# Patient Record
Sex: MALE
[De-identification: names, ages, dates, MRNs, and addresses within clinical notes are randomized per-mention and may not be internally consistent; named-entity substitution may affect disease eponyms.]

---

## 2019-03-27 ENCOUNTER — HOSPITAL ENCOUNTER (OUTPATIENT)
Dept: HOSPITAL 75 - PREOP | Age: 39
Discharge: HOME | End: 2019-03-27
Attending: SURGERY
Payer: COMMERCIAL

## 2019-03-27 VITALS — BODY MASS INDEX: 27.92 KG/M2 | WEIGHT: 195 LBS | HEIGHT: 70 IN

## 2019-03-27 DIAGNOSIS — Z01.818: Primary | ICD-10-CM

## 2019-04-02 ENCOUNTER — HOSPITAL ENCOUNTER (OUTPATIENT)
Dept: HOSPITAL 75 - ENDO | Age: 39
Discharge: HOME | End: 2019-04-02
Attending: SURGERY
Payer: COMMERCIAL

## 2019-04-02 VITALS — DIASTOLIC BLOOD PRESSURE: 91 MMHG | SYSTOLIC BLOOD PRESSURE: 133 MMHG

## 2019-04-02 VITALS — SYSTOLIC BLOOD PRESSURE: 139 MMHG | DIASTOLIC BLOOD PRESSURE: 91 MMHG

## 2019-04-02 VITALS — DIASTOLIC BLOOD PRESSURE: 98 MMHG | SYSTOLIC BLOOD PRESSURE: 149 MMHG

## 2019-04-02 VITALS — WEIGHT: 195 LBS | HEIGHT: 70 IN | BODY MASS INDEX: 27.92 KG/M2

## 2019-04-02 DIAGNOSIS — I10: ICD-10-CM

## 2019-04-02 DIAGNOSIS — Z12.11: Primary | ICD-10-CM

## 2019-04-02 DIAGNOSIS — Z80.0: ICD-10-CM

## 2019-04-02 DIAGNOSIS — Z79.899: ICD-10-CM

## 2019-04-02 NOTE — DISCHARGE INST-SIMPLE/STANDARD
Discharge Inst-Standard


Patient Instructions/Follow Up


Plan of Care/Instructions/FU:  


Repeat colonoscopy in 5 year any issues before then be seen at that time.


Activity as Tolerated:  Yes


Discharge Diet:  Regular Diet











LUCY CARRERA DO Apr 2, 2019 10:10

## 2019-04-02 NOTE — XMS REPORT
Continuity of Care Document

 Created on: 2019



JB JOHNSON

External Reference #: [id number]

: 1980

Sex: Male



Demographics







 Address  [address] [place]  [address]

 

 Home Phone  [phone] x

 

 Preferred Language  Unknown

 

 Marital Status  Unknown

 

 Hoahaoism Affiliation  Unknown

 

 Race  Unknown

 

 Ethnic Group  Unknown





Author







 Author  [facility]

 

 Organization  [facility]

 

 Address  Unknown

 

 Phone  Unavailable



              



Allergies

      



There is no data.                  



Medications

      



There is no data.                  



Problems

      



There is no data.                  



Procedures

      





 Code            Description            Performed By            Performed On   
     

 

             XRCXR                                  XR CHEST 2 VIEWS           
                        2015        



                  



Results

      



There is no data.              



Encounters

      





 ACCT No.            Visit Date/Time            Discharge            Status    
        Pt. Type            Provider            Facility            Loc./Unit  
          Complaint        

 

 [id number]            2015 07:40:29            2015 23:59:00       
     DIS            Outpatient            [facility]                     

 

 [id number]            2017 08:17:09                                      
Document Registration

## 2019-04-02 NOTE — PROGRESS NOTE-PRE OPERATIVE
Pre-Operative Progress Note


H&P Reviewed


The H&P was reviewed, patient examined and no changes noted.


Date Seen by Provider:  Apr 2, 2019


Time Seen by Provider:  08:50


Date H&P Reviewed:  Apr 2, 2019


Time H&P Reviewed:  08:50


Pre-Operative Diagnosis:  family history colon cancer











LUCY CARRERA DO Apr 2, 2019 08:51

## 2019-04-02 NOTE — PROGRESS NOTE-POST OPERATIVE
Post-Operative Progess Note


Surgeon (s)/Assistant (s)


Surgeon


LUCY CARRERA DO


Assistant:  na





Pre-Operative Diagnosis


family history colon cancer





Post-Operative Diagnosis





normal colon





Procedure & Operative Findings


Date of Procedure


4/2/19


Procedure Performed/Findings


colonoscopy


Anesthesia Type


per [facility]





Estimated Blood Loss


Estimated blood loss (mL):  none





Specimens/Packing


Specimens Removed


none











LUCY CARRERA DO Apr 2, 2019 10:08

## 2019-04-02 NOTE — OPERATIVE REPORT
DATE OF SERVICE:  04/02/2019



PREOPERATIVE DIAGNOSIS:

Family history of colon cancer.



POSTOPERATIVE DIAGNOSIS:

Normal colon.



PROCEDURE:

Colonoscopy.



SURGEON:

Lucy Alford DO



ANESTHESIA:

Per [facility].



ESTIMATED BLOOD LOSS:

None.



COMPLICATIONS:

None.



INDICATIONS:

The patient is a 38-year-old male with family history of colon cancer.  He

understands risks and benefits of procedure and wished to proceed with

procedure.  Consent was signed in the chart.



DESCRIPTION OF PROCEDURE:

The patient was taken to endoscopy suite, placed in left lower recumbent

position.  Timeout was performed.  Digital rectal exam was performed.  There

were no palpable polyps, masses or ulcerations.  Scope was inserted in the

rectum and advanced all the way to the cecum with minimal difficulty.  Prep was

adequate.  Scope was then slowly retracted back.  There were no polyps, masses

or ulcerations in the cecum, ascending, transverse, descending and sigmoid

colon.  Once in the rectum, scope was retroflexed noting no other pathology. 

Scope was returned to its normal position, slowly withdrawn until completely

removed.  The patient tolerated procedure well without any complications and

taken to recovery room in stable condition.



RECOMMENDATIONS:

The patient will need repeat colonoscopy in 5 years.  Any issues for that be

seen at that time.





Job ID: [id number]

DocumentID: [id number]

Dictated Date:  04/02/2019 10:13:00

Transcription Date: 04/02/2019 14:51:07

Dictated By: LUCY ALFORD DO

## 2019-04-02 NOTE — ANESTHESIA-GENERAL POST-OP
MAC


Patient Condition


Mental Status/LOC:  Same as Preop


Cardiovascular:  Satisfactory


Nausea/Vomiting:  Absent


Respiratory:  Satisfactory


Pain:  Controlled


Complications:  Absent





Post Op Complications


Complications


None





Follow Up Care/Instructions


Patient Instructions


None needed.





Anesthesiology Discharge Order


Discharge Order


Patient is doing well, no complaints, stable vital signs, no apparent adverse 

anesthesia problems.   


No complications reported per nursing.











AUGUSTO MILLER CRNA Apr 2, 2019 10:45

## 2020-06-27 ENCOUNTER — HOSPITAL ENCOUNTER (EMERGENCY)
Dept: HOSPITAL 75 - ER | Age: 40
Discharge: HOME | End: 2020-06-27
Payer: COMMERCIAL

## 2020-06-27 VITALS — WEIGHT: 209.44 LBS | HEIGHT: 69.69 IN | BODY MASS INDEX: 30.32 KG/M2

## 2020-06-27 VITALS — DIASTOLIC BLOOD PRESSURE: 95 MMHG | SYSTOLIC BLOOD PRESSURE: 142 MMHG
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
## 2020-06-27 DIAGNOSIS — I10: ICD-10-CM
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
## 2020-06-27 DIAGNOSIS — S06.0X0A: Primary | ICD-10-CM

## 2020-06-27 DIAGNOSIS — S05.11XA: ICD-10-CM

## 2020-06-27 DIAGNOSIS — Z80.0: ICD-10-CM

## 2020-06-27 PROCEDURE — 70450 CT HEAD/BRAIN W/O DYE: CPT

## 2020-06-27 PROCEDURE — 70486 CT MAXILLOFACIAL W/O DYE: CPT

## 2020-06-27 NOTE — XMS REPORT
Continuity of Care Document

                             Created on: 2020



Americo Long

External Reference #: [id number]

: 1980

Sex: Male



Demographics





                          Address                   [address] [place] [address]  [address]

 

                          Home Phone                [phone] x

 

                          Preferred Language        Unknown

 

                          Marital Status            Unknown

 

                          Restorationism Affiliation     Unknown

 

                          Race                      Unknown

 

                          Ethnic Group              Unknown





Author





                          Organization              Unknown

 

                          Address                   Unknown

 

                          Phone                     Unavailable



              



Allergies

      



             Active           Description           Code           Type         

  Severity   

                Reaction           Onset           Reported/Identified          

 

Relationship to Patient                 Clinical Status        

 

                Yes             No Known Drug Allergies           [id number]    

       Drug 

Allergy           Unknown           N/A                             2019  

      

                                                             



                  



Medications

      



There is no data.                  



Problems

      



             Date Dx Coded           Attending           Type           Code    

       

Diagnosis                               Diagnosed By        

 

                2019           JOSÉ CARRERA DO           Ot            

  Z01.818          

                          ENCOUNTER FOR OTHER PREPROCEDURAL EXAMIN              

      

 

                2019           JOSÉ CARRERA DO           Ot            

  Z01.818          

                          ENCOUNTER FOR OTHER PREPROCEDURAL EXAMIN              

      

 

                2019           JOSÉ CARRERA DO           Ot            

  Z01.818          

                          ENCOUNTER FOR OTHER PREPROCEDURAL EXAMIN              

      

 

                2019           JOSÉ CARRERA DO           Ot            

  Z01.818          

                          ENCOUNTER FOR OTHER PREPROCEDURAL EXAMIN              

      

 

                2019           JOSÉ CARRERA DO           Ot            

  Z01.818          

                          ENCOUNTER FOR OTHER PREPROCEDURAL EXAMIN              

      

 

                2019           JOSÉ CARRERA DO           Ot            

  Z01.818          

                          ENCOUNTER FOR OTHER PREPROCEDURAL EXAMIN              

      

 

             2019           JOSÉ CARRERA DO           Ot           I10 

          

ESSENTIAL (PRIMARY) HYPERTENSION                    

 

             2019           JOSÉ CARRERA DO           Ot           Z12.

11           

ENCOUNTER FOR SCREENING FOR MALIGNANT NE                    

 

                2019           JOSÉ CARRERA DO           Ot            

  Z79.899          

                          OTHER LONG TERM (CURRENT) DRUG THERAPY                

    

 

             2019           JOSÉ CARRERA DO           Ot           Z80.

0           

FAMILY HISTORY OF MALIGNANT NEOPLASM OF                     

 

             2019           JOSÉ CARRERA DO           Ot           I10 

          

ESSENTIAL (PRIMARY) HYPERTENSION                    

 

             2019           JOSÉ CARRERA DO           Ot           Z12.

11           

ENCOUNTER FOR SCREENING FOR MALIGNANT NE                    

 

                2019           JOSÉ CARRERA DO           Ot            

  Z79.899          

                          OTHER LONG TERM (CURRENT) DRUG THERAPY                

    

 

             2019           JOSÉ CARRERA DO           Ot           Z80.

0           

FAMILY HISTORY OF MALIGNANT NEOPLASM OF                     

 

             2020                        W            I10           Essent

ial (primary) 

hypertension                            Lisa Moreno        

 

             2020                        W            I10           Essent

ial (primary) 

hypertension                            JoshHumzaie        



                                                



Procedures

      



                Code            Description           Performed By           Per

formed On        

 

                                      XRCXR                                 XR C

HEST 2 VIEWS              

                                                    2015        



                  



Results

      



                                        Radiology Report from  [id number] on  08:26:36        

 

                                        EXAM: XR CHEST 2 VIEWSEXAM DATE: 

017INDICATION: annual physicalTECHNIQUE:

PA and lateral viewsCOMPARISON: 2015FINDINGS: The heart size is 
normal.The great vessels appear unremarkable.There is no hilar or mediastinal 
mass.The lungs are clear.There is no pleural effusion or 
pneumothorax.Postsurgical changes are noted in the left shoulder.IMPRESSION:  No
acute cardiopulmonary process.Finalized by José Moeller  on  2017 
09:24[facility]        



                



Encounters

      



                ACCT No.           Visit Date/Time           Discharge          

 Status         

             Pt. Type           Provider           Facility           Loc./Unit 

          

Complaint        

 

                [id number]            2020 14:40:41           2020 23:59:5

9           [facility]  

             Outpatient                                                         

  

 

                [id number]           2015 07:40:29           2015 23:

59:00           

DIS                 Outpatient           [facility]                             

 

             [id number]           2017 08:17:09                            

         

Document Registration                                                           

         

 

                    [id number]           2019 08:25:00           

019 10:55:00        

                DIS             Outpatient           JOSÉ CARRERA DO         

  [facility]           ENDO                      SCREENING/FAMILY HX COL

ON CANCER  

     

 

                    [id number]           2019 15:00:00           

019 15:45:00        

                DIS             Outpatient           JOSÉ CARRERA DO         

  [facility]           PREOP                     COLONOSCOPY

## 2020-06-27 NOTE — ED HEAD INJURY
General


Chief Complaint:  Head/Cervical Problems


Stated Complaint:  R EYE INJ / PAIN


Nursing Triage Note:  


ARRIVED VIA AMB TO ROOM 07.  STATES HE WAS HIT ON THE RIGHT EYE BY A BASEBALL 


WHILE AT PRACTICE. DENIES LOC.  SENT OVER FROM DR OLIVA OFFICE FOR A CT.  DR MARTINEZ NOTIFIED OF PT'S ARRIVAL


Source:  patient, family (sister (Dr. Menjivar))


Exam Limitations:  no limitations


 (ALESSIA CLEMENT MED STUDENT)





History of Present Illness


Date Seen by Provider:  Jun 27, 2020


Time Seen by Provider:  10:35


Initial Comments


Mr. Long presents to the emergency department after getting hit in the right 

eye with a baseball during batting practice. He denies LOC, headache, pain with 

eye movement, or vision changes. He admits to some nausea and dizziness. There 

is some swelling noted over the right superior orbit. Dr. Menjivar OD is the 

patients sister and called discussing the case prior to arrival. 


 (ALESSIA CLEMENT MED STUDENT)





Allergies and Home Medications


Allergies


Coded Allergies:  


     No Known Drug Allergies (Unverified , 3/27/19)





Home Medications


Losartan Potassium 100 Mg Tablet, 100 MG PO DAILY, (Reported)





Patient Home Medication List


Home Medication List Reviewed:  Yes


 (ALESSIA CLEMENT MED STUDENT)





Review of Systems


Review of Systems


Constitutional:  dizziness


Eyes:  See HPI


Ears, Nose, Mouth, Throat:  no symptoms reported


Respiratory:  no symptoms reported


Cardiovascular:  no symptoms reported


Gastrointestinal:  nausea; No vomiting


Genitourinary:  no symptoms reported


Musculoskeletal:  no symptoms reported


Skin:  no symptoms reported


Psychiatric/Neurological:  No Symptoms Reported (ALESSIA CLEMENT MED STUDENT)





Past Medical-Social-Family Hx


Patient Social History


Alcohol Use:  Occasionally Uses


Recreational Drug Use:  No


Smoking Status:  Never a Smoker


2nd Hand Smoke Exposure:  No


Recent Foreign Travel:  No


Contact w/Someone Who Travel:  No


Recent Infectious Disease Expo:  No


Recent Hopitalizations:  No


 (VENN,ALESSIA,MED STUDENT)





Immunizations Up To Date


Tetanus Booster (TDap):  Unknown


Date of Influenza Vaccine:  Oct 8, 2018


 (ALESSIA CLEMENT MED STUDENT)





Seasonal Allergies


Seasonal Allergies:  No


 (ALESSIA CLEMENT MED STUDENT)





Past Medical History


Surgeries:  Yes (SHOULDER X3)


Respiratory:  No


Cardiac:  Yes


Hypertension


Neurological:  No


Sexually Transmitted Disease:  No


HIV/AIDS:  No


Genitourinary:  No


Gastrointestinal:  No


Musculoskeletal:  No


Endocrine:  No


HEENT:  No (GLASSES/CONTACTS)


Loss of Vision:  Bilateral


Hearing Impairment:  Denies


Cancer:  No


Psychosocial:  No


Integumentary:  No


Blood Disorders:  No


Adverse Reaction/Blood Tranf:  No (N/A)


 (ALESSIA CLEMENT MED STUDENT)





Family Medical History





Colon cancer





Physical Exam


Vital Signs





Vital Signs - First Documented








 6/27/20





 10:00


 


Temp 36.3


 


Pulse 61


 


Resp 16


 


B/P (MAP) 136/96 (109)


 


Pulse Ox 98


 


O2 Delivery Room Air








 (APOLINAR MARTINEZ MD)


Vital Signs


Capillary Refill : Less Than 3 Seconds 


 (ALESSIA CLEMENT MED STUDENT)


Height, Weight, BMI


Height: 5'10.00"


Weight: 195lbs. 0.0oz. 88.492696yh; 30.00 BMI


Method:


 


 (VENN,ALESSIA,MED STUDENT)





Progress/Results/Core Measures


Results/Orders


My Orders





Orders - APOLINAR MARTINEZ MD


Ct Head/Maxillofacial Wo (6/27/20 10:27)


 (APOLINAR MARTINEZ MD)


Vital Signs/I&O











 6/27/20





 10:00


 


Temp 36.3


 


Pulse 61


 


Resp 16


 


B/P (MAP) 136/96 (109)


 


Pulse Ox 98


 


O2 Delivery Room Air





 (APOLINAR MARTINEZ MD)








Blood Pressure Mean:                    109











Departure


Impression





   Primary Impression:  


   Contusion of face


   Qualified Codes:  S00.83XA - Contusion of other part of head, initial 

   encounter


   Additional Impressions:  


   Hyphema, right eye


   Concussion


   Qualified Codes:  S06.0X0A - Concussion without loss of consciousness, 

   initial encounter


Disposition:  01 HOME, SELF-CARE


Condition:  Stable





Departure-Patient Inst.


Decision time for Depature:  11:30


 (APOLINAR MARTINEZ MD)


Referrals:  


WON CORTEZ MD (PCP/Family)


Primary Care Physician


Patient Instructions:  Concussion in Adults, Hyphema





Add. Discharge Instructions:  


Drink plenty of clear liquids to stay well-hydrated.


Observe cognitive and physical rest for the next couple of days while you 

recover from concussion.


Unless otherwise instructed by your eye doctor, you may take ibuprofen up to 600

mg every 6 hours as needed and/or Tylenol (acetaminophen) up to 1000 mg every 6 

hours as needed for pain.


You may apply ice in 20 minute intervals to affected areas.


Keep your head upright at least at a 45 angle until otherwise instructed by 

your eye care professional.


Avoid any activities that would predispose you to further head injury for at 

least 7 days after concussion symptoms resolve.  If any activity causes 

increasing concussion symptoms such as headache, nausea, confusion, 

irritability, vision changes, sleep disturbance, or other issues please stop 

that activity and at rest.


Return to care if you have worsening of symptoms that does not resolve with 

rest.





All discharge instructions reviewed with patient and/or family. Voiced 

understanding.











ALESSIA CLMEENT,MED STUDENT       Jun 27, 2020 11:09


APOLINAR MARTINEZ MD        Jun 27, 2020 11:33

## 2020-06-27 NOTE — DIAGNOSTIC IMAGING REPORT
PROCEDURE: CT head and maxillofacial without contrast.



TECHNIQUE: Multiple contiguous axial images were obtained through

the head and facial bones without the use of intravenous

contrast. Auto Exposure Controls were utilized during the CT exam

to meet ALARA standards for radiation dose reduction. 



INDICATION:  Head and face trauma, sports injury.



COMPARISON:  None



CT HEAD:



Ventricles normal in size, shape and position. There is no

midline shift or mass effect.  There is no hemorrhage or evidence

of acute ischemia. The bony calvarium is normal. No extra-axial

fluid collection is seen. The mastoid air cells are clear.



IMPRESSION: Negative CT head.



CT face:



Soft tissue swelling is seen overlying the right orbit. Both

globes appear intact. No retrobulbar inflammation or hemorrhage

is seen. The nasal septum is midline. No osseous fractures are

present. The paranasal sinuses are clear. The mandible and TMJ

intact.



IMPRESSION: Soft tissue swelling overlying the right orbit. No

underlying fracture identified.



Dictated by: 



  Dictated on workstation # [id number]